# Patient Record
Sex: MALE | Race: WHITE | NOT HISPANIC OR LATINO | URBAN - METROPOLITAN AREA
[De-identification: names, ages, dates, MRNs, and addresses within clinical notes are randomized per-mention and may not be internally consistent; named-entity substitution may affect disease eponyms.]

---

## 2024-01-22 NOTE — ASU PATIENT PROFILE, ADULT - NS PREOP UNDERSTANDS INFO
[FreeTextEntry1] : 62 yo male with history of palpitations and newly diagnosed Symptomatic Paroxysmal Atrial Fibrillation noted on event monitor with CHADS - VASc is 0 . \par \par Patient was seen and examined with Dr. He:  Atrial Fibrillation management, stroke prevention,  rate control and rhythm control and  medical management with AADs therapy, vs AV femi blocking agents vs a catheter ablation options  discussed  in details with the patient and his wife. At this time we recommend c/w Metoprolol ER 25 mg daily for rate control as well as ASA 81 mg daily for CHADS VASc 0. We also offered increasing his Metoprolol to 50 mg daily given one other episode of AFib with RVR, but pt states his symptoms have substantially improved and he would rather not try any increased medication doses or new medications at this time. Should his symptoms become more frequent, we can consider adding AAD or an ablation. \par \par Stress ECHO 11/6/2019  result is normal . We also referred him to pulmonary to r/o MARZENA which will be done on 11/14/2019. Recent TSH was normal. \par \par He will return for re-assessment in 6 months . He knows to contact the office if recurrent palpitations.\par He verbalized full understanding of the discussion and all questions were answered. 
No solid food or milk products after 12 mid-night 1/22/2024/ water for medication/ photo ID and insurance card/ comfortable clothing/ escort to take you home/yes

## 2024-01-22 NOTE — ASU PATIENT PROFILE, ADULT - NSICDXPASTMEDICALHX_GEN_ALL_CORE_FT
PAST MEDICAL HISTORY:  History of headache tention    Major depression      PAST MEDICAL HISTORY:  History of headache tension    Major depression      PAST MEDICAL HISTORY:  Asthma last used albuterol last week    History of headache tension    Major depression     Obstructive sleep apnea

## 2024-01-22 NOTE — ASU PATIENT PROFILE, ADULT - NSICDXPASTSURGICALHX_GEN_ALL_CORE_FT
PAST SURGICAL HISTORY:  H/O hernia repair childhood    H/O vasectomy     History of ankle surgery right ankle and knee due to ski accident     PAST SURGICAL HISTORY:  H/O hernia repair childhood    H/O vasectomy     History of ankle surgery right ankle and knee due to ice skating accident

## 2024-01-23 ENCOUNTER — OUTPATIENT (OUTPATIENT)
Dept: OUTPATIENT SERVICES | Facility: HOSPITAL | Age: 49
LOS: 1 days | Discharge: HOME | End: 2024-01-23
Payer: COMMERCIAL

## 2024-01-23 VITALS
RESPIRATION RATE: 14 BRPM | OXYGEN SATURATION: 99 % | HEART RATE: 78 BPM | SYSTOLIC BLOOD PRESSURE: 122 MMHG | DIASTOLIC BLOOD PRESSURE: 74 MMHG

## 2024-01-23 VITALS
OXYGEN SATURATION: 98 % | WEIGHT: 229.28 LBS | RESPIRATION RATE: 16 BRPM | SYSTOLIC BLOOD PRESSURE: 128 MMHG | DIASTOLIC BLOOD PRESSURE: 75 MMHG | HEART RATE: 75 BPM | TEMPERATURE: 97 F | HEIGHT: 72 IN

## 2024-01-23 DIAGNOSIS — Z98.52 VASECTOMY STATUS: Chronic | ICD-10-CM

## 2024-01-23 DIAGNOSIS — Z98.890 OTHER SPECIFIED POSTPROCEDURAL STATES: Chronic | ICD-10-CM

## 2024-01-23 PROCEDURE — 88300 SURGICAL PATH GROSS: CPT | Mod: 26

## 2024-01-23 RX ORDER — FENTANYL CITRATE 50 UG/ML
25 INJECTION INTRAVENOUS
Refills: 0 | Status: DISCONTINUED | OUTPATIENT
Start: 2024-01-23 | End: 2024-01-23

## 2024-01-23 RX ORDER — ONDANSETRON 8 MG/1
4 TABLET, FILM COATED ORAL ONCE
Refills: 0 | Status: DISCONTINUED | OUTPATIENT
Start: 2024-01-23 | End: 2024-01-23

## 2024-01-23 RX ORDER — SODIUM CHLORIDE 9 MG/ML
1000 INJECTION, SOLUTION INTRAVENOUS
Refills: 0 | Status: DISCONTINUED | OUTPATIENT
Start: 2024-01-23 | End: 2024-01-23

## 2024-01-23 RX ORDER — APREPITANT 80 MG/1
40 CAPSULE ORAL ONCE
Refills: 0 | Status: COMPLETED | OUTPATIENT
Start: 2024-01-23 | End: 2024-01-23

## 2024-01-23 RX ORDER — OMEPRAZOLE 10 MG/1
1 CAPSULE, DELAYED RELEASE ORAL
Refills: 0 | DISCHARGE

## 2024-01-23 RX ORDER — ACETAMINOPHEN 500 MG
1000 TABLET ORAL ONCE
Refills: 0 | Status: COMPLETED | OUTPATIENT
Start: 2024-01-23 | End: 2024-01-23

## 2024-01-23 RX ORDER — ALBUTEROL 90 UG/1
2 AEROSOL, METERED ORAL
Refills: 0 | DISCHARGE

## 2024-01-23 RX ORDER — BUPROPION HYDROCHLORIDE 150 MG/1
1 TABLET, EXTENDED RELEASE ORAL
Refills: 0 | DISCHARGE

## 2024-01-23 RX ADMIN — Medication 1000 MILLIGRAM(S): at 07:09

## 2024-01-23 RX ADMIN — APREPITANT 40 MILLIGRAM(S): 80 CAPSULE ORAL at 07:09

## 2024-01-23 NOTE — PRE-ANESTHESIA EVALUATION ADULT - SPO2 (%)
Follow up with Dr Srinivasan in 1 week.. Call office for appointment. Take medications as prescribed. Keep dressing clean, dry, and intact. Rest, ice, and elevate affected extremity. 98

## 2024-01-23 NOTE — BRIEF OPERATIVE NOTE - NSICDXBRIEFPROCEDURE_GEN_ALL_CORE_FT
PROCEDURES:  Septoplasty, nose, endoscopic 23-Jan-2024 10:14:19  Jeramie Mcmillan  Submucous resection of inferior turbinate 23-Jan-2024 10:14:38  Jeramie Mcmillan

## 2024-01-25 LAB — SURGICAL PATHOLOGY STUDY: SIGNIFICANT CHANGE UP
